# Patient Record
Sex: MALE | Race: WHITE | Employment: FULL TIME | URBAN - METROPOLITAN AREA
[De-identification: names, ages, dates, MRNs, and addresses within clinical notes are randomized per-mention and may not be internally consistent; named-entity substitution may affect disease eponyms.]

---

## 2024-08-24 ENCOUNTER — OFFICE VISIT (OUTPATIENT)
Age: 44
End: 2024-08-24

## 2024-08-24 VITALS
HEIGHT: 72 IN | OXYGEN SATURATION: 98 % | TEMPERATURE: 97.9 F | BODY MASS INDEX: 26.41 KG/M2 | RESPIRATION RATE: 16 BRPM | HEART RATE: 74 BPM | WEIGHT: 195 LBS | DIASTOLIC BLOOD PRESSURE: 82 MMHG | SYSTOLIC BLOOD PRESSURE: 127 MMHG

## 2024-08-24 DIAGNOSIS — S92.811A CLOSED FRACTURE OF SESAMOID BONE OF RIGHT FOOT, INITIAL ENCOUNTER: Primary | ICD-10-CM

## 2024-08-24 DIAGNOSIS — S96.911A STRAIN OF RIGHT FOOT, INITIAL ENCOUNTER: ICD-10-CM

## 2024-08-24 RX ORDER — ERGOCALCIFEROL 1.25 MG/1
CAPSULE ORAL
COMMUNITY

## 2024-08-24 NOTE — PROGRESS NOTES
Darryl Barnett (:  1980) is a 44 y.o. male,New patient, here for evaluation of the following chief complaint(s):  Foot Injury (Pt c/o right foot pain , that started hurting last week after exercising . Pt states it hurts to move his foot and he can't really put weight on it. )        SUBJECTIVE/OBJECTIVE:    History provided by:  Patient  Foot Injury          44 y.o. male presents with symptoms of:  Right foot pain for 1 week. Pain largely ball of foot under first MTP joint, some swelling, minor erythema. No history of gout. No recent injury to skin. States last Saturday and doing calf raises and flexing that the MTP joints and thinks that is what caused the injury. He is concerned for a stress fracture. No fevers, chills.         Vitals:    24 0952   BP: 127/82   Site: Right Upper Arm   Position: Sitting   Cuff Size: Medium Adult   Pulse: 74   Resp: 16   Temp: 97.9 °F (36.6 °C)   TempSrc: Oral   SpO2: 98%   Weight: 88.5 kg (195 lb)   Height: 1.829 m (6')       No results found for this visit on 24.       Physical exam accidentally deleted.  General: WNWD, NAD.  Right foot: Swelling and slight erythema over first MTP joint. Point tenderness over medial plantar surface of first MTP joint, no tenderness laterally or superiorly. Difficult to dorsiflex great toe at all due to pain. Limited ROM in all directions. Sensation intact. Brisk cap refill. No breaks in skin.      XR FOOT RIGHT (MIN 3 VIEWS)  Order: [id number]  Status: Final result       Visible to patient: No (not released)       Next appt: None       Dx: Strain of right foot, initial encounter    0 Result Notes  Details    Reading Physician Reading Date Result Priority   Lux Santamaria MD  [phone] 2024      Narrative & Impression  Exam  Three views of the Foot right     Comparison  None provided.       Findings  Small ossific fragment subjacent to the distal midfoot, visualized on lateral   view only.     No additional acute

## 2024-08-24 NOTE — PATIENT INSTRUCTIONS
Possible fracture of right foot sesamoid bone -  Ortho referral, please call to make an appointment  Recommend supportive padding for ball of foot and weight bearing as tolerated  If cannot tolerate weight bearing recommend use of crutches  Over the counter ibuprofen or naproxen for pain and swelling  Can use intermittent heat  Call or return to clinic if no improvement or any worsening